# Patient Record
Sex: MALE | Race: ASIAN | NOT HISPANIC OR LATINO | Employment: FULL TIME | URBAN - METROPOLITAN AREA
[De-identification: names, ages, dates, MRNs, and addresses within clinical notes are randomized per-mention and may not be internally consistent; named-entity substitution may affect disease eponyms.]

---

## 2017-06-27 ENCOUNTER — TELEPHONE (OUTPATIENT)
Dept: DERMATOLOGY | Facility: CLINIC | Age: 32
End: 2017-06-27

## 2017-06-27 NOTE — TELEPHONE ENCOUNTER
----- Message from Rahel Taylor sent at 6/27/2017  9:35 AM CDT -----  Contact: pt   Claudette-pt- pt is calling to speak with the nurse pt is a new pt to derm pt needs to be seen tomorrow for a rash on his lip can you please call pt at [phone] jc

## 2017-06-29 ENCOUNTER — OFFICE VISIT (OUTPATIENT)
Dept: DERMATOLOGY | Facility: CLINIC | Age: 32
End: 2017-06-29
Payer: COMMERCIAL

## 2017-06-29 VITALS — WEIGHT: 165 LBS

## 2017-06-29 DIAGNOSIS — L08.0 PYODERMA: ICD-10-CM

## 2017-06-29 DIAGNOSIS — B00.9 HERPES SIMPLEX: Primary | ICD-10-CM

## 2017-06-29 PROCEDURE — 87186 SC STD MICRODIL/AGAR DIL: CPT

## 2017-06-29 PROCEDURE — 87070 CULTURE OTHR SPECIMN AEROBIC: CPT

## 2017-06-29 PROCEDURE — 99999 PR PBB SHADOW E&M-EST. PATIENT-LVL III: CPT | Mod: PBBFAC,,, | Performed by: DERMATOLOGY

## 2017-06-29 PROCEDURE — 87529 HSV DNA AMP PROBE: CPT

## 2017-06-29 PROCEDURE — 99202 OFFICE O/P NEW SF 15 MIN: CPT | Mod: S$GLB,,, | Performed by: DERMATOLOGY

## 2017-06-29 PROCEDURE — 87077 CULTURE AEROBIC IDENTIFY: CPT

## 2017-06-29 RX ORDER — VALACYCLOVIR HYDROCHLORIDE 500 MG/1
500 TABLET, FILM COATED ORAL 2 TIMES DAILY
Qty: 6 TABLET | Refills: 11 | Status: SHIPPED | OUTPATIENT
Start: 2017-06-29

## 2017-06-29 RX ORDER — MUPIROCIN 20 MG/G
OINTMENT TOPICAL
Qty: 30 G | Refills: 2 | Status: SHIPPED | OUTPATIENT
Start: 2017-06-29

## 2017-06-29 NOTE — PATIENT INSTRUCTIONS
The Herpes Virus  Herpes is a virus that can cause sores on the skin. There are two types of the virus. Depending on how you come in contact with the virus, either type can cause outbreaks near the mouth or on the sex organs.  Understanding the herpes virus  Herpes reproduces only when it is inside the body. It does so by tricking a healthy cell into producing copies of the herpes virus. Each copy can infect nearby cells. But, before too long, the bodys defenses rally to stop the attack. The immune system forces the virus to retreat. For some people, an acute outbreak never happens again. For others, menstruation, illness, poor diet, fatigue, or stress makes outbreaks more likely.    How the herpes virus attacks  1. The herpes virus enters the body through a small break in the skin. The virus can also enter by direct contact with mucous membranes, such as those of the lips, vagina, or anus.  2. Inside the body, the herpes virus binds to a special site on a skin cell. Then part of the virus moves into the cell.  3. Inside the skin cell, the virus releases a set of instructions. These commands cause the cell to begin making copies of the herpes virus.  4. Herpes blisters appear on the skin. Herpes blisters may also appear on mucous membranes lining the mouth, vagina, or anus.  Date Last Reviewed: 10/27/2014  © 7897-0259 [employer]. [address] [place] [address]. All rights reserved. This information is not intended as a substitute for professional medical care. Always follow your healthcare professional's instructions.        Diagnosing Herpes  You will be asked about your health history. You may be asked about your eating and sleeping habits and sexual history. Mention if you have sores or if you have had any in the past. Also mention if you feel tingling or itching before an outbreak.  What a sore looks like        A herpes sore may first appear as a small white blister. The fluid  inside the blister is filled with the herpes virus. At this stage the virus sheds easily. This means it can be passed to other people.   A soft wet ulcer may form in place of the blister. The herpes virus is in the fluid of the open sore. As a result, the virus can still be spread to others.                 A soft crust forms as a new layer of skin grows. Fewer copies of the virus are present in the sore.   The skin surface is normal, but the virus remains in the body. Shedding is less likely, but it can still occur.      Testing for herpes  If herpes is suspected, tests such as these may be done to confirm the diagnosis:  · Viral culture: A small amount of fluid is swabbed from the base of a blister. The fluid is grown in a special culture with healthy cells. If herpes is present, it will alter the look of the cells.  · Fluorescent antibody test: Cells are taken from the base of a blister. They are stained and checked under a microscope. If herpes is present, the cells will change color.  · Other tests: If sores are not present, tests can be run on blood or cell samples. These tests show if you carry the herpes virus.   Date Last Reviewed: 10/27/2014  © 2987-1572 [employer]. [address] [place] [address], [place] [address]. All rights reserved. This information is not intended as a substitute for professional medical care. Always follow your healthcare professional's instructions.

## 2017-06-30 NOTE — PROGRESS NOTES
Subjective:       Patient ID:  Guevara LIMON Son is a 32 y.o. male who presents for   Chief Complaint   Patient presents with    Rash     lip     History of Present Illness: The patient presents with chief complaint of blisters  Location: upper lip  Duration: days  Signs/Symptoms: stings    Prior treatments: none        Rash         Review of Systems   Constitutional: Negative for fever.   Skin: Positive for rash. Negative for itching.   Hematologic/Lymphatic: Does not bruise/bleed easily.        Objective:    Physical Exam   Skin:   Areas Examined (abnormalities noted in diagram):   Head / Face Inspection Performed  Neck Inspection Performed  RUE Inspected  LUE Inspection Performed              Diagram Legend      See annotation      Assessment / Plan:        Herpes simplex  -     valacyclovir (VALTREX) 500 MG tablet; Take 1 tablet (500 mg total) by mouth 2 (two) times daily.  Dispense: 6 tablet; Refill: 11  -     Herpes Simplex Virus 1&2 PCR Non-Blood Skin    Pyoderma  -     Aerobic culture  -     mupirocin (BACTROBAN) 2 % ointment; AAA bid  Dispense: 30 g; Refill: 2

## 2017-07-01 LAB — BACTERIA SPEC AEROBE CULT: NORMAL

## 2017-07-03 ENCOUNTER — TELEPHONE (OUTPATIENT)
Dept: DERMATOLOGY | Facility: CLINIC | Age: 32
End: 2017-07-03

## 2017-07-03 RX ORDER — DOXYCYCLINE HYCLATE 100 MG
100 TABLET ORAL DAILY
Qty: 14 TABLET | Refills: 3 | Status: SHIPPED | OUTPATIENT
Start: 2017-07-03

## 2017-07-03 NOTE — TELEPHONE ENCOUNTER
----- Message from Zaynab Valle MD sent at 7/3/2017 10:24 AM CDT -----  The culture grew staph, he does not have the cold sore virus.  He should continue with the bactroban ointment he was given, stop the valtrex and start doxycycline 100mg for 14 day take with food. Call if recurs.

## 2017-07-14 LAB
HSV PCR SPECIMEN SOURCE: NORMAL
HSV1 PCR RESULT: NOT DETECTED
HSV2 PCR RESULT: NOT DETECTED

## 2021-02-08 ENCOUNTER — OCCUPATIONAL HEALTH (OUTPATIENT)
Dept: URGENT CARE | Facility: CLINIC | Age: 36
End: 2021-02-08

## 2021-02-08 DIAGNOSIS — Z02.1 PRE-EMPLOYMENT EXAMINATION: Primary | ICD-10-CM

## 2021-02-08 LAB
CTP QC/QA: YES
FECAL OCCULT BLOOD, POC: NEGATIVE

## 2021-02-08 PROCEDURE — 80305 DRUG TEST PRSMV DIR OPT OBS: CPT | Mod: S$GLB,,, | Performed by: NURSE PRACTITIONER

## 2021-02-08 PROCEDURE — 80305 OOH NON-DOT DRUG SCREEN: ICD-10-PCS | Mod: S$GLB,,, | Performed by: NURSE PRACTITIONER

## 2021-02-08 PROCEDURE — 87389 HIV 1 / 2 ANTIBODY: ICD-10-PCS | Mod: QW,S$GLB,, | Performed by: NURSE PRACTITIONER

## 2021-02-08 PROCEDURE — 86592 SYPHILIS TEST NON-TREP QUAL: CPT | Mod: S$GLB,,, | Performed by: NURSE PRACTITIONER

## 2021-02-08 PROCEDURE — 82270 OCCULT BLOOD FECES: CPT | Mod: S$GLB,,, | Performed by: NURSE PRACTITIONER

## 2021-02-08 PROCEDURE — 86580 POCT TB SKIN TEST: ICD-10-PCS | Mod: S$GLB,,, | Performed by: NURSE PRACTITIONER

## 2021-02-08 PROCEDURE — 87389 HIV-1 AG W/HIV-1&-2 AB AG IA: CPT | Mod: QW,S$GLB,, | Performed by: NURSE PRACTITIONER

## 2021-02-08 PROCEDURE — 97750 PR STRENGTH & FLEX: ICD-10-PCS | Mod: S$GLB,,, | Performed by: NURSE PRACTITIONER

## 2021-02-08 PROCEDURE — 89240 UNLISTED MISC PATH TEST: CPT | Mod: S$GLB,,, | Performed by: NURSE PRACTITIONER

## 2021-02-08 PROCEDURE — 86592 RPR: ICD-10-PCS | Mod: S$GLB,,, | Performed by: NURSE PRACTITIONER

## 2021-02-08 PROCEDURE — 89240 PANEL 3 (CMP, CBCW/DIFF, MUA, LIPID): ICD-10-PCS | Mod: S$GLB,,, | Performed by: NURSE PRACTITIONER

## 2021-02-08 PROCEDURE — 92552 PURE TONE AUDIOMETRY AIR: CPT | Mod: S$GLB,,, | Performed by: NURSE PRACTITIONER

## 2021-02-08 PROCEDURE — 82270 POCT OCCULT BLOOD STOOL: ICD-10-PCS | Mod: S$GLB,,, | Performed by: NURSE PRACTITIONER

## 2021-02-08 PROCEDURE — 97750 PHYSICAL PERFORMANCE TEST: CPT | Mod: S$GLB,,, | Performed by: NURSE PRACTITIONER

## 2021-02-08 PROCEDURE — 86580 TB INTRADERMAL TEST: CPT | Mod: S$GLB,,, | Performed by: NURSE PRACTITIONER

## 2021-02-08 PROCEDURE — 99499 UNLISTED E&M SERVICE: CPT | Mod: S$GLB,,, | Performed by: NURSE PRACTITIONER

## 2021-02-08 PROCEDURE — 99499 PHYSICAL, BASIC COMPLEXITY: ICD-10-PCS | Mod: S$GLB,,, | Performed by: NURSE PRACTITIONER

## 2021-02-08 PROCEDURE — 92552 AUDIOGRAM OCC MED: ICD-10-PCS | Mod: S$GLB,,, | Performed by: NURSE PRACTITIONER

## 2021-02-09 ENCOUNTER — TELEPHONE (OUTPATIENT)
Dept: URGENT CARE | Facility: CLINIC | Age: 36
End: 2021-02-09